# Patient Record
Sex: MALE | Race: WHITE | NOT HISPANIC OR LATINO | URBAN - METROPOLITAN AREA
[De-identification: names, ages, dates, MRNs, and addresses within clinical notes are randomized per-mention and may not be internally consistent; named-entity substitution may affect disease eponyms.]

---

## 2020-10-07 ENCOUNTER — VIRTUAL VISIT (OUTPATIENT)
Dept: FAMILY MEDICINE | Facility: OTHER | Age: 30
End: 2020-10-07

## 2020-10-07 DIAGNOSIS — Z20.822 SUSPECTED COVID-19 VIRUS INFECTION: Primary | ICD-10-CM

## 2020-10-07 NOTE — PROGRESS NOTES
"Date: 10/07/2020 10:08:01  Clinician: Sofia Foster  Clinician NPI: [id number]  Patient: Wil Guadalupe  Patient : 1990  Patient Address: [address], [place] [address]  Patient Phone: [phone]  Visit Protocol: URI  Patient Summary:  Wil is a 30 year old ( : 1990 ) male who initiated a [facility] Visit for COVID-19 (Coronavirus) evaluation and screening. When asked the question \"Please sign me up to receive news, health information and promotions. \", Wil responded \"No\".    Wil states his symptoms started 1-2 days ago.   His symptoms consist of a headache, a cough, nasal congestion, facial pain or pressure, myalgia, malaise, and diarrhea. He is experiencing difficulty breathing due to nasal congestion but he is not short of breath. Wil also feels feverish.   Symptom details     Nasal secretions: The color of his mucus is white.    Cough: Wil coughs a few times an hour and his cough is not more bothersome at night. Phlegm comes into his throat when he coughs. He believes his cough is caused by post-nasal drip. The color of the phlegm is white.     Temperature: His current temperature is 98 degrees Fahrenheit.     Facial pain or pressure: The facial pain or pressure does not feel worse when bending or leaning forward.     Headache: He states the headache is mild (1-3 on a 10 point pain scale).      Wil denies having ear pain, wheezing, anosmia, vomiting, rhinitis, nausea, chills, sore throat, teeth pain, and ageusia. He also denies having a sinus infection within the past year, taking antibiotic medication in the past month, and having recent facial or sinus surgery in the past 60 days.   Precipitating events  He has recently been exposed to someone with influenza. Wil has not been in close contact with any high risk individuals.   Pertinent COVID-19 (Coronavirus) information  In the past 14 days, Wil has not worked in a congregate living " setting.   He does not work or volunteer as healthcare worker or a  and does not work or volunteer in a healthcare facility.   Wil also has not lived in a congregate living setting in the past 14 days. He does not live with a healthcare worker.   Wil has had a close contact with a laboratory-confirmed COVID-19 patient within 14 days of symptom onset. 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 We didnt find out until my employee had already came into work   Since December 2019, Wil and has not had upper respiratory infection or influenza-like illness. Has not been diagnosed with lab-confirmed COVID-19 test   Pertinent medical history  Wil does not need a return to work/school note.   Weight: 220 lbs   Wil does not smoke or use smokeless tobacco.   Additional information as reported by the patient (free text): Hoping to schedule covid testing for myself and my loved one.   Weight: 220 lbs    MEDICATIONS: No current medications, ALLERGIES: NKDA  Clinician Response:  Dear Wil,   Your symptoms show that you may have coronavirus (COVID-19). This illness can cause fever, cough and trouble breathing. Many people get a mild case and get better on their own. Some people can get very sick.  What should I do?  We would like to test you for this virus.   1. Please call [phone] to schedule your visit. Explain that you were [facility] by [facility] to have a COVID-19 test. Be ready to share your [facility] ID number.  The following will serve as your written order for this COVID Test, ordered by me, for the indication of suspected COVID [Z20.828]: The test will be ordered in [facility], our electronic health record, after you are scheduled. It will show as ordered and authorized by Berhane Townsend MD.  Order: COVID-19 (Coronavirus) PCR  "[facility]MPTOMATIC [facility] from [facility].      2. When it's time for your COVID test:  Stay at least 6 feet away from others. (If someone will drive you to your test, stay in the backseat, as far away from the  as you can.)   Cover your mouth and nose with a mask, tissue or washcloth.  Go straight to the testing site. Don't make any stops on the way there or back.      3.Starting now: Stay home and away from others (self-isolate) until:   You've had no fever---and no medicine that reduces fever---for one full day (24 hours). And...   Your other symptoms have gotten better. For example, your cough or breathing has improved. And...   At least 10 days have passed since your symptoms started.       During this time, don't leave the house except for testing or medical care.   Stay in your own room, even for meals. Use your own bathroom if you can.   Stay away from others in your home. No hugging, kissing or shaking hands. No visitors.  Don't go to work, school or anywhere else.    Clean \"high touch\" surfaces often (doorknobs, counters, handles, etc.). Use a household cleaning spray or wipes. You'll find a full list of  on the EPA website: www.epa.gov/pesticide-registration/list-n-disinfectants-use-against-sars-cov-2.   Cover your mouth and nose with a mask, tissue or washcloth to avoid spreading germs.  Wash your hands and face often. Use soap and water.  Caregivers in these groups are at risk for severe illness due to COVID-19:  o People 65 years and older  o People who live in a nursing home or long-term care facility  o People with chronic disease (lung, heart, cancer, diabetes, kidney, liver, immunologic)  o People who have a weakened immune system, including those who:   Are in cancer treatment  Take medicine that weakens the immune system, such as corticosteroids  Had a bone marrow or organ transplant  Have an immune deficiency  Have poorly controlled HIV or AIDS  Are obese (body mass index of 40 or " higher)  Smoke regularly   o Caregivers should wear gloves while washing dishes, handling laundry and cleaning bedrooms and bathrooms.  o Use caution when washing and drying laundry: Don't shake dirty laundry, and use the warmest water setting that you can.  o For more tips, go to www.cdc.gov/coronavirus/2019-ncov/downloads/10Things.pdf.    4.Sign up for [employer]. We know it's scary to hear that you might have COVID-19. We want to track your symptoms to make sure you're okay over the next 2 weeks. Please look for an email from [employer]---this is a free, online program that we'll use to keep in touch. To sign up, follow the link in the email. Learn more at [email or web address]  How can I take care of myself?   Get lots of rest. Drink extra fluids (unless a doctor has told you not to).   Take Tylenol (acetaminophen) for fever or pain. If you have liver or kidney problems, ask your family doctor if it's okay to take Tylenol.   Adults can take either:    650 mg (two 325 mg pills) every 4 to 6 hours, or...   1,000 mg (two 500 mg pills) every 8 hours as needed.    Note: Don't take more than 3,000 mg in one day. Acetaminophen is found in many medicines (both prescribed and over-the-counter medicines). Read all labels to be sure you don't take too much.   For children, check the Tylenol bottle for the right dose. The dose is based on the child's age or weight.    If you have other health problems (like cancer, heart failure, an organ transplant or severe kidney disease): Call your specialty clinic if you don't feel better in the next 2 days.       Know when to call 911. Emergency warning signs include:    Trouble breathing or shortness of breath Pain or pressure in the chest that doesn't go away Feeling confused like you haven't felt before, or not being able to wake up Bluish-colored lips or face.  Where can I get more information?    [facility] -- About COVID-19: [email or web address]    CDC -- What to Do If You're Sick: www.cdc.gov/coronavirus/2019-ncov/about/steps-when-sick.html   CDC -- Ending Home Isolation: www.cdc.gov/coronavirus/2019-ncov/hcp/disposition-in-home-patients.html   CDC -- Caring for Someone: www.cdc.gov/coronavirus/2019-ncov/if-you-are-sick/care-for-someone.html   [facility] -- Interim Guidance for Hospital Discharge to Home: www.health.[facility].[place]./diseases/coronavirus/hcp/hospdischarge.pdf   [facility] clinical trials (COVID-19 research studies): clinicalaffairs.[facility]-clinical-trials    Below are the COVID-19 hotlines at the [place] Department of Health ([facility]). Interpreters are available.    For health questions: Call 666-423-2107 or 1-679.845.9002 (7 a.m. to 7 p.m.) For questions about schools and childcare: Call 549-803-7492 or 1-655.840.6450 (7 a.m. to 7 p.m.)    COVID-19 (Coronavirus) General Information  Because there is currently no vaccine to prevent infection, the best way to protect yourself is to avoid being exposed to this virus. Common symptoms of COVID-19 include but are not limited to fever, cough, and shortness of breath. These symptoms appear 2-14 days after you are exposed to the virus that causes COVID-19. Click here for more information from the CDC on how to protect yourself.  If you are sick with COVID-19 or suspect you are infected with the virus that causes COVID-19, follow the steps here from the CDC to help prevent the disease from spreading to people in your home and community.  Click here for general information from the CDC on testing.  If you develop any of these emergency warning signs for COVID-19, get medical attention immediately:     Trouble breathing    Persistent pain or pressure in the chest    New confusion or inability to arouse    Bluish lips or face      Call your doctor or clinic before going in. Call [phone] if you have a medical emergency and notify the  you have or think you may have COVID-19.  For more detailed and  up to date information on COVID-19 (Coronavirus), please visit the CDC website.   Diagnosis: Cough  Diagnosis ICD: R05

## 2020-10-08 DIAGNOSIS — Z20.822 SUSPECTED COVID-19 VIRUS INFECTION: ICD-10-CM

## 2020-10-08 PROCEDURE — U0003 INFECTIOUS AGENT DETECTION BY NUCLEIC ACID (DNA OR RNA); SEVERE ACUTE RESPIRATORY SYNDROME CORONAVIRUS 2 (SARS-COV-2) (CORONAVIRUS DISEASE [COVID-19]), AMPLIFIED PROBE TECHNIQUE, MAKING USE OF HIGH THROUGHPUT TECHNOLOGIES AS DESCRIBED BY CMS-2020-01-R: HCPCS | Performed by: FAMILY MEDICINE

## 2020-10-09 LAB
SARS-COV-2 RNA SPEC QL NAA+PROBE: NOT DETECTED
SPECIMEN SOURCE: NORMAL